# Patient Record
Sex: MALE | Race: WHITE | URBAN - METROPOLITAN AREA
[De-identification: names, ages, dates, MRNs, and addresses within clinical notes are randomized per-mention and may not be internally consistent; named-entity substitution may affect disease eponyms.]

---

## 2017-09-11 DIAGNOSIS — E78.5 HYPERLIPIDEMIA LDL GOAL <130: ICD-10-CM

## 2017-09-11 DIAGNOSIS — I10 ESSENTIAL HYPERTENSION WITH GOAL BLOOD PRESSURE LESS THAN 140/90: ICD-10-CM

## 2017-09-11 NOTE — TELEPHONE ENCOUNTER
enalapril (VASOTEC) 20 MG tablet  Last Written Prescription Date: 09/22/2016  Last Fill Quantity: 180, # refills: 3  Last Office Visit with [facility], [facility] or [facility] prescribing provider: 09/22/2016       Potassium   Date Value Ref Range Status   01/07/2016 4.4 3.4 - 5.3 mmol/L Final     Creatinine   Date Value Ref Range Status   01/07/2016 0.88 0.66 - 1.25 mg/dL Final     BP Readings from Last 3 Encounters:   12/16/16 131/82   12/06/16 132/88   09/22/16 (!) 142/92      atorvastatin (LIPITOR) 20 MG tablet    Last Written Prescription Date: 09/22/2016  Last Fill Quantity: 90, # refills: 3  Last Office Visit with [facility], [facility] or [facility] prescribing provider: 09/22/2016       Lab Results   Component Value Date    CHOL 190 01/07/2016     Lab Results   Component Value Date    HDL 54 01/07/2016     Lab Results   Component Value Date    LDL 93 01/07/2016     Lab Results   Component Value Date    TRIG 213 01/07/2016     Lab Results   Component Value Date    CHOLHDLRATIO 3.9 07/22/2015      hydrochlorothiazide (HYDRODIURIL) 25 MG tablet     Last Written Prescription Date: 09/22/2016  Last Fill Quantity: 90, # refills: 3  Last Office Visit with [facility], [facility] or [facility] prescribing provider: 09/22/2016       Potassium   Date Value Ref Range Status   01/07/2016 4.4 3.4 - 5.3 mmol/L Final     Creatinine   Date Value Ref Range Status   01/07/2016 0.88 0.66 - 1.25 mg/dL Final     BP Readings from Last 3 Encounters:   12/16/16 131/82   12/06/16 132/88   09/22/16 (!) 142/92     Renata Echavarria MA

## 2017-09-12 NOTE — TELEPHONE ENCOUNTER
Routing refill request to provider for review/approval because:  Labs not current:  K, Rubio, FLP      Ana Bennett RN - BC

## 2017-09-13 RX ORDER — ATORVASTATIN CALCIUM 20 MG/1
TABLET, FILM COATED ORAL
Qty: 90 TABLET | Refills: 0 | Status: SHIPPED | OUTPATIENT
Start: 2017-09-13 | End: 2017-09-18

## 2017-09-13 RX ORDER — HYDROCHLOROTHIAZIDE 25 MG/1
TABLET ORAL
Qty: 90 TABLET | Refills: 0 | Status: SHIPPED | OUTPATIENT
Start: 2017-09-13 | End: 2017-09-18

## 2017-09-13 RX ORDER — ENALAPRIL MALEATE 20 MG/1
TABLET ORAL
Qty: 180 TABLET | Refills: 0 | Status: SHIPPED | OUTPATIENT
Start: 2017-09-13 | End: 2017-09-18

## 2017-09-13 NOTE — TELEPHONE ENCOUNTER
Spoke to patient and informed him of below message. Patient has appointment scheduled for 9/18/2017.  Donna MOMIN CMA ([facility])

## 2017-09-14 NOTE — PROGRESS NOTES
SUBJECTIVE:   Brennon Johnson is a 51 year old male who presents to clinic today for the following health issues:      Diabetes Follow-up      Patient is checking blood sugars: 2 times a week-  fasting    Diabetic concerns: None     Symptoms of hypoglycemia (low blood sugar): none     Paresthesias (numbness or burning in feet) or sores: Yes, sores on both feet     Date of last diabetic eye exam: patient states he has never had one        Amount of exercise or physical activity: 4-5 days/week for an average of greater than 60 minutes    Problems taking medications regularly: No    Medication side effects: none  Diet: regular (no restrictions)      Hypertension- patient monitors blood pressure at home and it runs 130/90.  He denies any side effects from medications.    Patient feels his diet could be better controlled.  He knows what he needs to do, but doesn't put it into practice.    Problem list and histories reviewed & adjusted, as indicated.  Additional history: as documented    Patient Active Problem List   Diagnosis     Hyperlipidemia LDL goal <130     Hypertension goal BP (blood pressure) < 140/90     Type 2 diabetes mellitus without complication (H)     Erectile dysfunction, unspecified erectile dysfunction type     Morbid obesity (H)     Past Surgical History:   Procedure Laterality Date     COLONOSCOPY WITH CO2 INSUFFLATION N/A 12/16/2016    Procedure: COLONOSCOPY WITH CO2 INSUFFLATION;  Surgeon: Joan Fernandez MD;  Location:  OR       Social History   Substance Use Topics     Smoking status: Never Smoker     Smokeless tobacco: Never Used     Alcohol use Yes      Comment: 1 Beer daily     Family History   Problem Relation Age of Onset     Breast Cancer Mother      Hypertension Father      CEREBROVASCULAR DISEASE Father      Prostate Cancer Father      HEART DISEASE Father      Obesity Father          Current Outpatient Prescriptions   Medication Sig Dispense Refill     hydrochlorothiazide  (HYDRODIURIL) 25 MG tablet TAKE 1 TABLET(25 MG) BY MOUTH DAILY 90 tablet 1     atorvastatin (LIPITOR) 20 MG tablet Take 1 tablet (20 mg) by mouth daily 90 tablet 1     enalapril (VASOTEC) 20 MG tablet TAKE 1 TABLET(20 MG) BY MOUTH TWICE DAILY 180 tablet 1     amLODIPine (NORVASC) 5 MG tablet TAKE 1 TABLET(5 MG) BY MOUTH DAILY 90 tablet 1     ketoconazole (NIZORAL) 2 % cream Apply topically daily 60 g 1     sildenafil (REVATIO/VIAGRA) 20 MG tablet Take 1-2 tablets (20-40 mg) by mouth daily as needed for erectile dysfunction Take 30 min to 4 hours before intercourse.  Never use with nitroglycerin, terazosin or doxazosin. 18 tablet 3     blood glucose monitoring (NO BRAND SPECIFIED) meter device kit Use to test blood sugar one times daily or as directed. 1 kit 0     blood glucose monitoring (ACCU-CHEK FASTCLIX) lancets Use to check blood sugar twice daily 1 Box 3     blood glucose monitoring (ACCU-CHEK SMARTVIEW) test strip Use to test blood sugar 2 times daily or as directed. 100 each prn     blood glucose calibration (ACCU-CHEK SMARTVIEW CONTROL) solution Use to calibrate blood glucose monitor as needed as directed. 1 Bottle prn     aspirin 81 MG tablet Take 1 tablet by mouth daily.       MULTI VIT/FL OR 1 TABLET DAILY       [DISCONTINUED] hydrochlorothiazide (HYDRODIURIL) 25 MG tablet TAKE 1 TABLET(25 MG) BY MOUTH DAILY 90 tablet 0     [DISCONTINUED] atorvastatin (LIPITOR) 20 MG tablet TAKE 1 TABLET BY MOUTH DAILY 90 tablet 0     [DISCONTINUED] enalapril (VASOTEC) 20 MG tablet TAKE 1 TABLET(20 MG) BY MOUTH TWICE DAILY 180 tablet 0     [DISCONTINUED] amLODIPine (NORVASC) 5 MG tablet TAKE 1 TABLET(5 MG) BY MOUTH DAILY 30 tablet 0     Allergies   Allergen Reactions     Nkda [No Known Drug Allergies]      BP Readings from Last 3 Encounters:   09/18/17 132/84   12/16/16 131/82   12/06/16 132/88    Wt Readings from Last 3 Encounters:   09/18/17 247 lb 8 oz (112.3 kg)   12/12/16 240 lb (108.9 kg)   09/22/16 243 lb (110.2  "kg)                  Labs reviewed in EPIC        Reviewed and updated as needed this visit by clinical staff       Reviewed and updated as needed this visit by Provider         ROS:  Constitutional, HEENT, cardiovascular, pulmonary, gi and gu systems are negative, except as otherwise noted.      OBJECTIVE:   /84  Pulse 89  Temp 97.3  F (36.3  C) (Oral)  Ht 5' 10\" (1.778 m)  Wt 247 lb 8 oz (112.3 kg)  SpO2 99%  BMI 35.51 kg/m2  Body mass index is 35.51 kg/(m^2).  GENERAL: healthy, alert and no distress  RESP: lungs clear to auscultation - no rales, rhonchi or wheezes  CV: regular rate and rhythm, normal S1 S2, no S3 or S4, no murmur, click or rub, no peripheral edema and peripheral pulses strong  MS: no gross musculoskeletal defects noted, no edema  Diabetic foot exam: normal DP and PT pulses, no trophic changes or ulcerative lesions, normal sensory exam and tinea pedis    Diagnostic Test Results:  Results for orders placed or performed in visit on 09/18/17 (from the past 24 hour(s))   HEMOGLOBIN A1C   Result Value Ref Range    Hemoglobin A1C 6.3 (H) 4.3 - 6.0 %       ASSESSMENT/PLAN:     1. Type 2 diabetes mellitus without complication, without long-term current use of insulin (H)    - Albumin Random Urine Quantitative with Creat Ratio    2. Morbid obesity due to excess calories (H)  Patient declines nutrition referral, Health  referral.    3. Essential hypertension with goal blood pressure less than 140/90  Stable.  Continue current treatment plan and medications.    - hydrochlorothiazide (HYDRODIURIL) 25 MG tablet; TAKE 1 TABLET(25 MG) BY MOUTH DAILY  Dispense: 90 tablet; Refill: 1  - enalapril (VASOTEC) 20 MG tablet; TAKE 1 TABLET(20 MG) BY MOUTH TWICE DAILY  Dispense: 180 tablet; Refill: 1  - amLODIPine (NORVASC) 5 MG tablet; TAKE 1 TABLET(5 MG) BY MOUTH DAILY  Dispense: 90 tablet; Refill: 1    4. Hyperlipidemia LDL goal <130  Stable.  Continue current treatment plan and medications.    - Lipid " panel reflex to direct LDL  - atorvastatin (LIPITOR) 20 MG tablet; Take 1 tablet (20 mg) by mouth daily  Dispense: 90 tablet; Refill: 1    5. Screening for diabetic peripheral neuropathy    - FOOT EXAM  NO CHARGE [75711.114]    6. Need for prophylactic vaccination against Streptococcus pneumoniae (pneumococcus)    - Pneumococcal vaccine 23 valent PPSV23  (Pneumovax) [05456]  - ADMIN: Vaccine, Initial (55367)    7. Tinea pedis of both feet    - ketoconazole (NIZORAL) 2 % cream; Apply topically daily  Dispense: 60 g; Refill: 1    FUTURE APPOINTMENTS:       - Patient moving to [place] and will establish care there.    SUSAN [facility]

## 2017-09-18 ENCOUNTER — OFFICE VISIT (OUTPATIENT)
Dept: INTERNAL MEDICINE | Facility: CLINIC | Age: 52
End: 2017-09-18
Payer: COMMERCIAL

## 2017-09-18 VITALS
HEART RATE: 89 BPM | WEIGHT: 247.5 LBS | DIASTOLIC BLOOD PRESSURE: 84 MMHG | SYSTOLIC BLOOD PRESSURE: 132 MMHG | TEMPERATURE: 97.3 F | HEIGHT: 70 IN | BODY MASS INDEX: 35.43 KG/M2 | OXYGEN SATURATION: 99 %

## 2017-09-18 DIAGNOSIS — B35.3 TINEA PEDIS OF BOTH FEET: ICD-10-CM

## 2017-09-18 DIAGNOSIS — E66.01 MORBID OBESITY DUE TO EXCESS CALORIES (H): ICD-10-CM

## 2017-09-18 DIAGNOSIS — Z23 NEED FOR PROPHYLACTIC VACCINATION AGAINST STREPTOCOCCUS PNEUMONIAE (PNEUMOCOCCUS): ICD-10-CM

## 2017-09-18 DIAGNOSIS — E78.5 HYPERLIPIDEMIA LDL GOAL <130: ICD-10-CM

## 2017-09-18 DIAGNOSIS — Z13.89 SCREENING FOR DIABETIC PERIPHERAL NEUROPATHY: ICD-10-CM

## 2017-09-18 DIAGNOSIS — I10 ESSENTIAL HYPERTENSION WITH GOAL BLOOD PRESSURE LESS THAN 140/90: ICD-10-CM

## 2017-09-18 DIAGNOSIS — E11.9 TYPE 2 DIABETES MELLITUS WITHOUT COMPLICATION, WITHOUT LONG-TERM CURRENT USE OF INSULIN (H): Primary | ICD-10-CM

## 2017-09-18 LAB
ANION GAP SERPL CALCULATED.3IONS-SCNC: 10 MMOL/L (ref 3–14)
BUN SERPL-MCNC: 29 MG/DL (ref 7–30)
CALCIUM SERPL-MCNC: 9.1 MG/DL (ref 8.5–10.1)
CHLORIDE SERPL-SCNC: 101 MMOL/L (ref 94–109)
CHOLEST SERPL-MCNC: 187 MG/DL
CO2 SERPL-SCNC: 26 MMOL/L (ref 20–32)
CREAT SERPL-MCNC: 1.02 MG/DL (ref 0.66–1.25)
CREAT UR-MCNC: 157 MG/DL
GFR SERPL CREATININE-BSD FRML MDRD: 77 ML/MIN/1.7M2
GLUCOSE SERPL-MCNC: 151 MG/DL (ref 70–99)
HBA1C MFR BLD: 6.3 % (ref 4.3–6)
HDLC SERPL-MCNC: 57 MG/DL
LDLC SERPL CALC-MCNC: 84 MG/DL
MICROALBUMIN UR-MCNC: 24 MG/L
MICROALBUMIN/CREAT UR: 15.16 MG/G CR (ref 0–17)
NONHDLC SERPL-MCNC: 130 MG/DL
POTASSIUM SERPL-SCNC: 4.1 MMOL/L (ref 3.4–5.3)
SODIUM SERPL-SCNC: 137 MMOL/L (ref 133–144)
TRIGL SERPL-MCNC: 228 MG/DL

## 2017-09-18 PROCEDURE — 36415 COLL VENOUS BLD VENIPUNCTURE: CPT | Performed by: NURSE PRACTITIONER

## 2017-09-18 PROCEDURE — 82043 UR ALBUMIN QUANTITATIVE: CPT | Performed by: NURSE PRACTITIONER

## 2017-09-18 PROCEDURE — 80061 LIPID PANEL: CPT | Performed by: NURSE PRACTITIONER

## 2017-09-18 PROCEDURE — 90471 IMMUNIZATION ADMIN: CPT | Performed by: NURSE PRACTITIONER

## 2017-09-18 PROCEDURE — 83036 HEMOGLOBIN GLYCOSYLATED A1C: CPT | Performed by: NURSE PRACTITIONER

## 2017-09-18 PROCEDURE — 90732 PPSV23 VACC 2 YRS+ SUBQ/IM: CPT | Performed by: NURSE PRACTITIONER

## 2017-09-18 PROCEDURE — 80048 BASIC METABOLIC PNL TOTAL CA: CPT | Performed by: NURSE PRACTITIONER

## 2017-09-18 PROCEDURE — 99214 OFFICE O/P EST MOD 30 MIN: CPT | Mod: 25 | Performed by: NURSE PRACTITIONER

## 2017-09-18 PROCEDURE — 99207 C FOOT EXAM  NO CHARGE: CPT | Performed by: NURSE PRACTITIONER

## 2017-09-18 RX ORDER — AMLODIPINE BESYLATE 5 MG/1
TABLET ORAL
Qty: 90 TABLET | Refills: 1 | Status: SHIPPED | OUTPATIENT
Start: 2017-09-18 | End: 2018-03-15

## 2017-09-18 RX ORDER — ATORVASTATIN CALCIUM 20 MG/1
20 TABLET, FILM COATED ORAL DAILY
Qty: 90 TABLET | Refills: 1 | Status: SHIPPED | OUTPATIENT
Start: 2017-09-18 | End: 2018-09-21

## 2017-09-18 RX ORDER — KETOCONAZOLE 20 MG/G
CREAM TOPICAL DAILY
Qty: 60 G | Refills: 1 | Status: SHIPPED | OUTPATIENT
Start: 2017-09-18 | End: 2018-09-21

## 2017-09-18 RX ORDER — ENALAPRIL MALEATE 20 MG/1
TABLET ORAL
Qty: 180 TABLET | Refills: 1 | Status: SHIPPED | OUTPATIENT
Start: 2017-09-18 | End: 2018-06-10

## 2017-09-18 RX ORDER — HYDROCHLOROTHIAZIDE 25 MG/1
TABLET ORAL
Qty: 90 TABLET | Refills: 1 | Status: SHIPPED | OUTPATIENT
Start: 2017-09-18 | End: 2018-06-11

## 2017-09-18 ASSESSMENT — PAIN SCALES - GENERAL: PAINLEVEL: NO PAIN (0)

## 2017-09-18 NOTE — PROGRESS NOTES
Dear Brennon,    Your recent test results are attached.       No excess protein in the urine.      If you have any questions please feel free to contact [phone] or myself via TrialPayt.    Sincerely,  Christina Berman, CNP

## 2017-09-18 NOTE — MR AVS SNAPSHOT
After Visit Summary   9/18/2017    Brennon Johnson    MRN: [id number]           Patient Information     Date Of Birth          1965        Visit Information        Provider Department      9/18/2017 8:20 AM Christina Berman APRN [facility]        Today's Diagnoses     Type 2 diabetes mellitus without complication, without long-term current use of insulin (H)    -  1    Morbid obesity due to excess calories (H)        Essential hypertension with goal blood pressure less than 140/90        Hyperlipidemia LDL goal <130        Screening for diabetic peripheral neuropathy        Need for prophylactic vaccination against Streptococcus pneumoniae (pneumococcus)        Tinea pedis of both feet          Care Instructions    [facility]    If you have any questions regarding to your visit please contact your care team:     Team Pink:   Clinic Hours Telephone Number   Internal Medicine:  Dr. Dayana Berman, NP       7am-7pm  Monday - Thursday   7am-5pm  Fridays  [phone]  (Appointment scheduling available 24/7)    Questions about your visit?  Team Line  [phone]   Urgent Care - [place] - 11am-9pm Monday-Friday Saturday-Sunday- 9am-5pm   [place] - 5pm-9pm Monday-Friday Saturday-Sunday- 9am-5pm  [phone] - [place]   [phone] - [place]       What options do I have for visits at the clinic other than the traditional office visit?  To expand how we care for you, many of our providers are utilizing electronic visits (e-visits) and telephone visits, when medically appropriate, for interactions with their patients rather than a visit in the clinic.   We also offer nurse visits for many medical concerns. Just like any other service, we will bill your insurance company for this type of visit based on time spent on the phone with your provider. Not all insurance companies cover these  visits. Please check with your medical insurance if this type of visit is covered. You will be responsible for any charges that are not paid by your insurance.      E-visits via Aqua-toolshart:  generally incur a $35.00 fee.  Telephone visits:  Time spent on the phone: *charged based on time that is spent on the phone in increments of 10 minutes. Estimated cost:   5-10 mins $30.00   11-20 mins. $59.00   21-30 mins. $85.00   Use Ridejoy (secure email communication and access to your chart) to send your primary care provider a message or make an appointment. Ask someone on your Team how to sign up for Ridejoy.    For a Price Quote for your services, please call our cooala - your brands Line at [phone].    As always, Thank you for trusting us with your health care needs!    Nirmala Sanders, CMA            Follow-ups after your visit        Who to contact     If you have questions or need follow up information about today's clinic visit or your schedule please contact [facility] directly at [phone].  Normal or non-critical lab and imaging results will be communicated to you by Aqua-toolshart, letter or phone within 4 business days after the clinic has received the results. If you do not hear from us within 7 days, please contact the clinic through Mysafeplacet or phone. If you have a critical or abnormal lab result, we will notify you by phone as soon as possible.  Submit refill requests through Ridejoy or call your pharmacy and they will forward the refill request to us. Please allow 3 business days for your refill to be completed.          Additional Information About Your Visit        Aqua-toolshart Information     Ridejoy gives you secure access to your electronic health record. If you see a primary care provider, you can also send messages to your care team and make appointments. If you have questions, please call your primary care clinic.  If you do not have a primary care provider, please call [phone] and they will  "assist you.        Care EveryWhere ID     This is your Care EveryWhere ID. This could be used by other organizations to access your [facility] medical records  [id number]        Your Vitals Were     Pulse Temperature Height Pulse Oximetry BMI (Body Mass Index)       89 97.3  F (36.3  C) (Oral) 5' 10\" (1.778 m) 99% 35.51 kg/m2        Blood Pressure from Last 3 Encounters:   09/18/17 132/84   12/16/16 131/82   12/06/16 132/88    Weight from Last 3 Encounters:   09/18/17 247 lb 8 oz (112.3 kg)   12/12/16 240 lb (108.9 kg)   09/22/16 243 lb (110.2 kg)              We Performed the Following     ADMIN: Vaccine, Initial (31966)     Albumin Random Urine Quantitative with Creat Ratio     BASIC METABOLIC PANEL     FOOT EXAM  NO CHARGE [16261.114]     HEMOGLOBIN A1C     Lipid panel reflex to direct LDL     Pneumococcal vaccine 23 valent PPSV23  (Pneumovax) [29918]          Today's Medication Changes          These changes are accurate as of: 9/18/17  8:55 AM.  If you have any questions, ask your nurse or doctor.               Start taking these medicines.        Dose/Directions    ketoconazole 2 % cream   Commonly known as:  NIZORAL   Used for:  Tinea pedis of both feet   Started by:  Christina Berman APRN CNP        Apply topically daily   Quantity:  60 g   Refills:  1         These medicines have changed or have updated prescriptions.        Dose/Directions    amLODIPine 5 MG tablet   Commonly known as:  NORVASC   This may have changed:  See the new instructions.   Used for:  Essential hypertension with goal blood pressure less than 140/90   Changed by:  Christina Berman APRN CNP        TAKE 1 TABLET(5 MG) BY MOUTH DAILY   Quantity:  90 tablet   Refills:  1       atorvastatin 20 MG tablet   Commonly known as:  LIPITOR   This may have changed:  See the new instructions.   Used for:  Hyperlipidemia LDL goal <130   Changed by:  Christina Berman APRN CNP        Dose:  20 mg   Take 1 tablet (20 mg) by " mouth daily   Quantity:  90 tablet   Refills:  1       enalapril 20 MG tablet   Commonly known as:  VASOTEC   This may have changed:  See the new instructions.   Used for:  Essential hypertension with goal blood pressure less than 140/90   Changed by:  Christina Berman APRN CNP        TAKE 1 TABLET(20 MG) BY MOUTH TWICE DAILY   Quantity:  180 tablet   Refills:  1       hydrochlorothiazide 25 MG tablet   Commonly known as:  HYDRODIURIL   This may have changed:  See the new instructions.   Used for:  Essential hypertension with goal blood pressure less than 140/90   Changed by:  Christina Berman APRN CNP        TAKE 1 TABLET(25 MG) BY MOUTH DAILY   Quantity:  90 tablet   Refills:  1            Where to get your medicines      These medications were sent to [id number] [place] [facility], [address] AT [facility] [address]  [address] [facility] [facility] [address], [place] [address]     Phone:  [phone]     amLODIPine 5 MG tablet    atorvastatin 20 MG tablet    enalapril 20 MG tablet    hydrochlorothiazide 25 MG tablet    ketoconazole 2 % cream                Primary Care Provider Office Phone # Fax #    Hugo Perez [phone] [address] [facility] [address]        Equal Access to Services     [facility] AH: Hadii candi ku hadasho Soomaali, waaxda luqadaha, qaybta kaalmada adeegyada, waxay idiin hayjayeshn aleksandar laughlin. So [facility] [phone].    ATENCIÓN: Si habla español, tiene a villalba disposición servicios gratuitos de asistencia lingüística. ame al [phone].    We comply with applicable federal civil rights laws and [place] laws. We do not discriminate on the basis of race, color, national origin, age, disability sex, sexual orientation or gender identity.            Thank you!     Thank you for choosing [facility]  for your care. Our goal is always to provide you with excellent care. Hearing back from our patients is one  way we can continue to improve our services. Please take a few minutes to complete the written survey that you may receive in the mail after your visit with us. Thank you!             Your Updated Medication List - Protect others around you: Learn how to safely use, store and throw away your medicines at www.disposemymeds.org.          This list is accurate as of: 9/18/17  8:55 AM.  Always use your most recent med list.                   Brand Name Dispense Instructions for use Diagnosis    amLODIPine 5 MG tablet    NORVASC    90 tablet    TAKE 1 TABLET(5 MG) BY MOUTH DAILY    Essential hypertension with goal blood pressure less than 140/90       aspirin 81 MG tablet      Take 1 tablet by mouth daily.        atorvastatin 20 MG tablet    LIPITOR    90 tablet    Take 1 tablet (20 mg) by mouth daily    Hyperlipidemia LDL goal <130       blood glucose calibration solution     1 Bottle    Use to calibrate blood glucose monitor as needed as directed.    Diabetes mellitus, type 2 (H)       blood glucose monitoring lancets     1 Box    Use to check blood sugar twice daily    Diabetes mellitus, type 2 (H)       blood glucose monitoring meter device kit    no brand specified    1 kit    Use to test blood sugar one times daily or as directed.    Type 2 diabetes mellitus without complication (H)       blood glucose monitoring test strip    ACCU-CHEK SMARTVIEW    100 each    Use to test blood sugar 2 times daily or as directed.    Diabetes mellitus, type 2 (H)       enalapril 20 MG tablet    VASOTEC    180 tablet    TAKE 1 TABLET(20 MG) BY MOUTH TWICE DAILY    Essential hypertension with goal blood pressure less than 140/90       hydrochlorothiazide 25 MG tablet    HYDRODIURIL    90 tablet    TAKE 1 TABLET(25 MG) BY MOUTH DAILY    Essential hypertension with goal blood pressure less than 140/90       ketoconazole 2 % cream    NIZORAL    60 g    Apply topically daily    Tinea pedis of both feet       MULTI VIT/FL PO      1 TABLET  DAILY        sildenafil 20 MG tablet    REVATIO    18 tablet    Take 1-2 tablets (20-40 mg) by mouth daily as needed for erectile dysfunction Take 30 min to 4 hours before intercourse.  Never use with nitroglycerin, terazosin or doxazosin.    Erectile dysfunction, unspecified erectile dysfunction type

## 2017-09-18 NOTE — PATIENT INSTRUCTIONS
[facility]    If you have any questions regarding to your visit please contact your care team:     Team Pink:   Clinic Hours Telephone Number   Internal Medicine:  Dr. Dayana Berman NP       7am-7pm  Monday - Thursday   7am-5pm  Fridays  [phone]  (Appointment scheduling available 24/7)    Questions about your visit?  Team Line  [phone]   Urgent [facility] - [place] [facility] - 11am-9pm Monday-Friday Saturday-Sunday- 9am-5pm   [place] - 5pm-9pm Monday-Friday Saturday-Sunday- 9am-5pm  [phone] - [place]   [phone] - [place]       What options do I have for visits at the clinic other than the traditional office visit?  To expand how we care for you, many of our providers are utilizing electronic visits (e-visits) and telephone visits, when medically appropriate, for interactions with their patients rather than a visit in the clinic.   We also offer nurse visits for many medical concerns. Just like any other service, we will bill your insurance company for this type of visit based on time spent on the phone with your provider. Not all insurance companies cover these visits. Please check with your medical insurance if this type of visit is covered. You will be responsible for any charges that are not paid by your insurance.      E-visits via BigBad:  generally incur a $35.00 fee.  Telephone visits:  Time spent on the phone: *charged based on time that is spent on the phone in increments of 10 minutes. Estimated cost:   5-10 mins $30.00   11-20 mins. $59.00   21-30 mins. $85.00   Use Max-Wellnesst (secure email communication and access to your chart) to send your primary care provider a message or make an appointment. Ask someone on your Team how to sign up for BigBad.    For a Price Quote for your services, please call our Consumer Price Line at [phone].    As always, Thank you for trusting us with your health care  needs!    Nirmala Sanders, CMA

## 2017-09-18 NOTE — PROGRESS NOTES
Dear Brennon,    Your recent test results are attached.      Good cholesterol.  Normal kidney function and electrolytes.      If you have any questions please feel free to contact [phone] or myself via SCHEDitt.    Sincerely,  Christina Berman, CNP

## 2017-09-18 NOTE — NURSING NOTE
"Chief Complaint   Patient presents with     Diabetes     follow-up, no concerns       Initial /90 (BP Location: Left arm, Cuff Size: Adult Large)  Pulse 89  Temp 97.3  F (36.3  C) (Oral)  Ht 5' 10\" (1.778 m)  Wt 247 lb 8 oz (112.3 kg)  SpO2 99%  BMI 35.51 kg/m2 Estimated body mass index is 35.51 kg/(m^2) as calculated from the following:    Height as of this encounter: 5' 10\" (1.778 m).    Weight as of this encounter: 247 lb 8 oz (112.3 kg).  Medication Reconciliation: complete       Nirmala Sanders CMA      "

## 2017-11-01 ENCOUNTER — ALLIED HEALTH/NURSE VISIT (OUTPATIENT)
Dept: NURSING | Facility: CLINIC | Age: 52
End: 2017-11-01
Payer: COMMERCIAL

## 2017-11-01 DIAGNOSIS — Z23 NEED FOR PROPHYLACTIC VACCINATION AND INOCULATION AGAINST INFLUENZA: Primary | ICD-10-CM

## 2017-11-01 PROCEDURE — 99207 ZZC NO CHARGE NURSE ONLY: CPT

## 2017-11-01 PROCEDURE — 90471 IMMUNIZATION ADMIN: CPT

## 2017-11-01 PROCEDURE — 90686 IIV4 VACC NO PRSV 0.5 ML IM: CPT

## 2017-11-01 NOTE — NURSING NOTE
Prior to injection verified patient identity using patient's name and date of birth.  Donna MOMIN CMA ([facility])

## 2017-11-01 NOTE — MR AVS SNAPSHOT
After Visit Summary   11/1/2017    Brennon Johnson    MRN: [id number]           Patient Information     Date Of Birth          1965        Visit Information        Provider Department      11/1/2017 9:45 AM FZ ANCILLARY [facility]        Today's Diagnoses     Need for prophylactic vaccination and inoculation against influenza    -  1       Follow-ups after your visit        Who to contact     If you have questions or need follow up information about today's clinic visit or your schedule please contact [facility] directly at [phone].  Normal or non-critical lab and imaging results will be communicated to you by [email or web address], letter or phone within 4 business days after the clinic has received the results. If you do not hear from us within 7 days, please contact the clinic through HealthSourcet or phone. If you have a critical or abnormal lab result, we will notify you by phone as soon as possible.  Submit refill requests through Lanyrd or call your pharmacy and they will forward the refill request to us. Please allow 3 business days for your refill to be completed.          Additional Information About Your Visit        MyChart Information     Lanyrd gives you secure access to your electronic health record. If you see a primary care provider, you can also send messages to your care team and make appointments. If you have questions, please call your primary care clinic.  If you do not have a primary care provider, please call [phone] and they will assist you.        Care EveryWhere ID     This is your Care EveryWhere ID. This could be used by other organizations to access your [facility] medical records  [id number]         Blood Pressure from Last 3 Encounters:   09/18/17 132/84   12/16/16 131/82   12/06/16 132/88    Weight from Last 3 Encounters:   09/18/17 247 lb 8 oz (112.3 kg)   12/12/16 240 lb (108.9 kg)   09/22/16 243 lb (110.2 kg)              We Performed the  Following     FLU VAC, SPLIT VIRUS IM > 3 YO (QUADRIVALENT) [63122]     Vaccine Administration, Initial [25824]        Primary Care Provider Office Phone # Fax #    Hugo Perez [phone] [address] [facility] [address]        Equal Access to Services     [facility] : Hadii aad ku hadasho Soomaali, waaxda luqadaha, qaybta kaalmada adeegyada, waxay idiin hayaan adeeg kharash la'aan . So [facility] [phone].    ATENCIÓN: Si habla español, tiene a villalba disposición servicios gratuitos de asistencia lingüística. Llame al [phone].    We comply with applicable federal civil rights laws and [place] laws. We do not discriminate on the basis of race, color, national origin, age, disability, sex, sexual orientation, or gender identity.            Thank you!     Thank you for choosing [facility]  for your care. Our goal is always to provide you with excellent care. Hearing back from our patients is one way we can continue to improve our services. Please take a few minutes to complete the written survey that you may receive in the mail after your visit with us. Thank you!             Your Updated Medication List - Protect others around you: Learn how to safely use, store and throw away your medicines at www.disposemymeds.org.          This list is accurate as of: 11/1/17  9:45 AM.  Always use your most recent med list.                   Brand Name Dispense Instructions for use Diagnosis    amLODIPine 5 MG tablet    NORVASC    90 tablet    TAKE 1 TABLET(5 MG) BY MOUTH DAILY    Essential hypertension with goal blood pressure less than 140/90       aspirin 81 MG tablet      Take 1 tablet by mouth daily.        atorvastatin 20 MG tablet    LIPITOR    90 tablet    Take 1 tablet (20 mg) by mouth daily    Hyperlipidemia LDL goal <130       blood glucose calibration solution     1 Bottle    Use to calibrate blood glucose monitor as needed as directed.    Diabetes mellitus,  type 2 (H)       blood glucose monitoring lancets     1 Box    Use to check blood sugar twice daily    Diabetes mellitus, type 2 (H)       blood glucose monitoring meter device kit    no brand specified    1 kit    Use to test blood sugar one times daily or as directed.    Type 2 diabetes mellitus without complication (H)       blood glucose monitoring test strip    ACCU-CHEK SMARTVIEW    100 each    Use to test blood sugar 2 times daily or as directed.    Diabetes mellitus, type 2 (H)       enalapril 20 MG tablet    VASOTEC    180 tablet    TAKE 1 TABLET(20 MG) BY MOUTH TWICE DAILY    Essential hypertension with goal blood pressure less than 140/90       hydrochlorothiazide 25 MG tablet    HYDRODIURIL    90 tablet    TAKE 1 TABLET(25 MG) BY MOUTH DAILY    Essential hypertension with goal blood pressure less than 140/90       ketoconazole 2 % cream    NIZORAL    60 g    Apply topically daily    Tinea pedis of both feet       MULTI VIT/FL PO      1 TABLET DAILY        sildenafil 20 MG tablet    REVATIO    18 tablet    Take 1-2 tablets (20-40 mg) by mouth daily as needed for erectile dysfunction Take 30 min to 4 hours before intercourse.  Never use with nitroglycerin, terazosin or doxazosin.    Erectile dysfunction, unspecified erectile dysfunction type

## 2017-12-17 DIAGNOSIS — I10 ESSENTIAL HYPERTENSION WITH GOAL BLOOD PRESSURE LESS THAN 140/90: ICD-10-CM

## 2017-12-17 DIAGNOSIS — E78.5 HYPERLIPIDEMIA LDL GOAL <130: ICD-10-CM

## 2017-12-20 RX ORDER — HYDROCHLOROTHIAZIDE 25 MG/1
TABLET ORAL
Qty: 90 TABLET | Refills: 0 | Status: SHIPPED | OUTPATIENT
Start: 2017-12-20 | End: 2018-09-06

## 2017-12-20 RX ORDER — ATORVASTATIN CALCIUM 20 MG/1
TABLET, FILM COATED ORAL
Qty: 90 TABLET | Refills: 0 | Status: SHIPPED | OUTPATIENT
Start: 2017-12-20 | End: 2018-09-05

## 2017-12-20 NOTE — TELEPHONE ENCOUNTER
Pharmacy closed. Will call after 9 to confirm duplicate.    hydrochlorothiazide (HYDRODIURIL) 25 MG tablet 90 tablet 1 9/18/2017  No      Sig: TAKE 1 TABLET(25 MG) BY MOUTH DAILY     Class: E-Prescribe     Order: [id number]     E-Prescribing Status: Receipt confirmed by pharmacy (9/18/2017  8:51 AM CDT)     atorvastatin (LIPITOR) 20 MG tablet 90 tablet 1 9/18/2017  No      Sig: Take 1 tablet (20 mg) by mouth daily     Class: E-Prescribe     Route: Oral     Order: [id number]     E-Prescribing Status: Receipt confirmed by pharmacy (9/18/2017  8:51 AM CDT)       Donna MOMIN CMA ([facility])

## 2017-12-29 DIAGNOSIS — N52.9 ERECTILE DYSFUNCTION, UNSPECIFIED ERECTILE DYSFUNCTION TYPE: ICD-10-CM

## 2018-01-02 RX ORDER — SILDENAFIL CITRATE 20 MG/1
TABLET ORAL
Qty: 18 TABLET | Refills: 0 | Status: SHIPPED | OUTPATIENT
Start: 2018-01-02 | End: 2018-01-16

## 2018-01-16 DIAGNOSIS — N52.9 ERECTILE DYSFUNCTION, UNSPECIFIED ERECTILE DYSFUNCTION TYPE: ICD-10-CM

## 2018-01-17 RX ORDER — SILDENAFIL CITRATE 20 MG/1
TABLET ORAL
Qty: 18 TABLET | Refills: 0 | Status: SHIPPED | OUTPATIENT
Start: 2018-01-17 | End: 2018-01-22

## 2018-01-17 NOTE — TELEPHONE ENCOUNTER
"Routing refill request to provider for review/approval because:  Failed [facility] refill protocol, see below:      Requested Prescriptions   Pending Prescriptions Disp Refills     sildenafil (REVATIO) 20 MG tablet 18 tablet 0     Sig: Take 1-2 tablets (20-40 mg) by mouth daily as needed for erectile dysfunction Take 30 min to 4 hours before intercourse.  Never use with nitroglycerin, terazosin or doxazosin.    Erectile Dysfuction Protocol Failed    1/16/2018  9:15 AM       Failed - Recent or future visit with authorizing provider    Patient had office visit in the last year or has a visit in the next 30 days with authorizing provider.  See \"Patient Info\" tab in inbasket, or \"Choose Columns\" in Meds & Orders section of the refill encounter.            Passed - Absence of nitrates on medication list       Passed - Absence of Alpha Blockers on Med list       Passed - Patient is age 18 or older        Ana Bennett RN - BC      "

## 2018-09-06 DIAGNOSIS — I10 ESSENTIAL HYPERTENSION WITH GOAL BLOOD PRESSURE LESS THAN 140/90: ICD-10-CM

## 2018-09-06 RX ORDER — HYDROCHLOROTHIAZIDE 25 MG/1
TABLET ORAL
Qty: 90 TABLET | Refills: 0 | Status: SHIPPED | OUTPATIENT
Start: 2018-09-06 | End: 2018-09-21

## 2018-09-06 RX ORDER — ENALAPRIL MALEATE 20 MG/1
TABLET ORAL
Qty: 180 TABLET | Refills: 0 | Status: SHIPPED | OUTPATIENT
Start: 2018-09-06 | End: 2018-09-21

## 2018-09-06 RX ORDER — AMLODIPINE BESYLATE 5 MG/1
TABLET ORAL
Qty: 90 TABLET | Refills: 0 | Status: SHIPPED | OUTPATIENT
Start: 2018-09-06 | End: 2018-09-21

## 2018-09-17 NOTE — PROGRESS NOTES
SUBJECTIVE:   Brennon Johnson is a 52 year old male who presents to clinic today for the following health issues:    Diabetes Follow-up    Diet controlled.    Patient is checking blood sugars: 1 times a week    Diabetic concerns: None     Symptoms of hypoglycemia (low blood sugar): none     Paresthesias (numbness or burning in feet) or sores: No     Date of last diabetic eye exam: DUE     Diabetes Management Resources    Hyperlipidemia Follow-Up    Rate your low fat/cholesterol diet?: good    Taking statin?  No    Other lipid medications/supplements?:  none    Hypertension Follow-up    Outpatient blood pressures are being checked at home.  Results are 130/90.    Low Salt Diet: no added salt    BP Readings from Last 2 Encounters:   09/18/17 132/84   12/16/16 131/82     Hemoglobin A1C (%)   Date Value   09/18/2017 6.3 (H)   09/22/2016 6.2 (H)     LDL Cholesterol Calculated (mg/dL)   Date Value   09/18/2017 84   01/07/2016 93     Weight:   Losing weight  Wt Readings from Last 4 Encounters:   09/21/18 236 lb 3.2 oz (107.1 kg)   09/18/17 247 lb 8 oz (112.3 kg)   12/12/16 240 lb (108.9 kg)   09/22/16 243 lb (110.2 kg)       Amount of exercise or physical activity: 4-5 days/week for an average of 15-30 minutes    Problems taking medications regularly: No    Medication side effects: none    Diet: regular (no restrictions), low salt, low fat/cholesterol and diabetic    Problem list and histories reviewed & adjusted, as indicated.  Additional history: as documented    Patient Active Problem List   Diagnosis     Hyperlipidemia LDL goal <130     Hypertension goal BP (blood pressure) < 140/90     Type 2 diabetes mellitus without complication (H)     Erectile dysfunction, unspecified erectile dysfunction type     Morbid obesity (H)     Past Surgical History:   Procedure Laterality Date     COLONOSCOPY WITH CO2 INSUFFLATION N/A 12/16/2016    Procedure: COLONOSCOPY WITH CO2 INSUFFLATION;  Surgeon: Joan Fernandez MD;   "Location: [facility] OR       Social History   Substance Use Topics     Smoking status: Never Smoker     Smokeless tobacco: Never Used     Alcohol use Yes      Comment: 1 Beer daily     Family History   Problem Relation Age of Onset     Breast Cancer Mother      Hypertension Father      Cerebrovascular Disease Father      Prostate Cancer Father      HEART DISEASE Father      Obesity Father            Reviewed and updated as needed this visit by clinical staff  Tobacco  Allergies  Meds  Med Hx  Surg Hx  Fam Hx  Soc Hx      Reviewed and updated as needed this visit by Provider       ROS:  Constitutional, HEENT, cardiovascular, pulmonary, gi and gu systems are negative, except as otherwise noted.    OBJECTIVE:     /78 (BP Location: Left arm, Patient Position: Chair, Cuff Size: Adult Regular)  Pulse 80  Temp 97.2  F (36.2  C) (Oral)  Resp 14  Ht 5' 10\" (1.778 m)  Wt 236 lb 3.2 oz (107.1 kg)  SpO2 97%  BMI 33.89 kg/m2  Body mass index is 33.89 kg/(m^2).  GENERAL: healthy, alert and no distress  NECK: no adenopathy and thyroid normal to palpation  RESP: lungs clear to auscultation - no rales, rhonchi or wheezes  CV: regular rate and rhythm, normal S1 S2, no S3 or S4, no murmur, click or rub, no peripheral edema.  ABDOMEN: soft, nontender, no masses and bowel sounds normal  MS: no gross musculoskeletal defects noted, no edema    Diabetic foot exam: normal DP and PT pulses, no trophic changes or ulcerative lesions and normal sensory exam    ASSESSMENT/PLAN:     Diabetes Type II, A1c Controlled, non-insulin dependent   Associated with the following complications    Renal Complications:  None    Ophthalmologic Complications: None    Neurologic Complications: None    Peripheral Vascular Complications:  None    Other: None   Plan:  No changes in the patient's current treatment plan      Hyperlipidemia; controlled   Plan:  Medications:   Statin - Lipitor    Hypertension; controlled   Associated with the following " complications:    None   Plan:  Medications:     ACE/ARB - Enalapril  Diuretic - Hctz  Calcium channel blocker - Amlodipine    (E11.9) Type 2 diabetes mellitus without complication, without long-term current use of insulin (H)  (primary encounter diagnosis)  Comment: A1c 6.3. Eye check and foot exam  Plan: Hemoglobin A1c, Lipid panel reflex to direct         LDL Fasting, Basic metabolic panel, Albumin         Random Urine Quantitative with Creat Ratio,         OPHTHALMOLOGY ADULT REFERRAL    (E78.5) Hyperlipidemia LDL goal <100  Comment: Check LDL and TSH. Refill medication.  Plan: TSH with free T4 reflex, atorvastatin (LIPITOR)        20 MG tablet    (I10) Hypertension goal BP (blood pressure) < 140/90  Comment: At goal. BMP. Refill medication  Plan: hydrochlorothiazide (HYDRODIURIL) 25 MG tablet,        enalapril (VASOTEC) 20 MG tablet, amLODIPine         (NORVASC) 5 MG tablet    (Z68.33) BMI 33.0-33.9,adult  Comment: Losing weight  Plan: Health lifestyle    (Z23) Need for prophylactic vaccination and inoculation against influenza  Plan: FLU VACCINE, (RIV4) RECOMBINANT HA  , IM         (FluBlok, egg free) [19258]- >18 YRS ([facility]         recommended  50-64 YRS), Vaccine         Administration, Initial [12181]    Follow up in 6 months or sooner with concerns    Hugo Perez MD  [facility]

## 2018-09-21 ENCOUNTER — OFFICE VISIT (OUTPATIENT)
Dept: FAMILY MEDICINE | Facility: CLINIC | Age: 53
End: 2018-09-21
Payer: COMMERCIAL

## 2018-09-21 VITALS
HEIGHT: 70 IN | SYSTOLIC BLOOD PRESSURE: 112 MMHG | DIASTOLIC BLOOD PRESSURE: 78 MMHG | OXYGEN SATURATION: 97 % | BODY MASS INDEX: 33.82 KG/M2 | RESPIRATION RATE: 14 BRPM | HEART RATE: 80 BPM | WEIGHT: 236.2 LBS | TEMPERATURE: 97.2 F

## 2018-09-21 DIAGNOSIS — I10 HYPERTENSION GOAL BP (BLOOD PRESSURE) < 140/90: ICD-10-CM

## 2018-09-21 DIAGNOSIS — E78.5 HYPERLIPIDEMIA LDL GOAL <100: ICD-10-CM

## 2018-09-21 DIAGNOSIS — Z23 NEED FOR PROPHYLACTIC VACCINATION AND INOCULATION AGAINST INFLUENZA: ICD-10-CM

## 2018-09-21 DIAGNOSIS — E11.9 TYPE 2 DIABETES MELLITUS WITHOUT COMPLICATION, WITHOUT LONG-TERM CURRENT USE OF INSULIN (H): Primary | ICD-10-CM

## 2018-09-21 LAB
ANION GAP SERPL CALCULATED.3IONS-SCNC: 8 MMOL/L (ref 3–14)
BUN SERPL-MCNC: 26 MG/DL (ref 7–30)
CALCIUM SERPL-MCNC: 9.7 MG/DL (ref 8.5–10.1)
CHLORIDE SERPL-SCNC: 106 MMOL/L (ref 94–109)
CHOLEST SERPL-MCNC: 182 MG/DL
CO2 SERPL-SCNC: 27 MMOL/L (ref 20–32)
CREAT SERPL-MCNC: 1.03 MG/DL (ref 0.66–1.25)
CREAT UR-MCNC: 188 MG/DL
GFR SERPL CREATININE-BSD FRML MDRD: 76 ML/MIN/1.7M2
GLUCOSE SERPL-MCNC: 140 MG/DL (ref 70–99)
HBA1C MFR BLD: 6.3 % (ref 0–5.6)
HDLC SERPL-MCNC: 50 MG/DL
LDLC SERPL CALC-MCNC: 96 MG/DL
MICROALBUMIN UR-MCNC: 12 MG/L
MICROALBUMIN/CREAT UR: 6.49 MG/G CR (ref 0–17)
NONHDLC SERPL-MCNC: 132 MG/DL
POTASSIUM SERPL-SCNC: 4.4 MMOL/L (ref 3.4–5.3)
SODIUM SERPL-SCNC: 141 MMOL/L (ref 133–144)
TRIGL SERPL-MCNC: 182 MG/DL
TSH SERPL DL<=0.005 MIU/L-ACNC: 2.15 MU/L (ref 0.4–4)

## 2018-09-21 PROCEDURE — 80061 LIPID PANEL: CPT | Performed by: FAMILY MEDICINE

## 2018-09-21 PROCEDURE — 90471 IMMUNIZATION ADMIN: CPT | Performed by: FAMILY MEDICINE

## 2018-09-21 PROCEDURE — 84443 ASSAY THYROID STIM HORMONE: CPT | Performed by: FAMILY MEDICINE

## 2018-09-21 PROCEDURE — 82043 UR ALBUMIN QUANTITATIVE: CPT | Performed by: FAMILY MEDICINE

## 2018-09-21 PROCEDURE — 80048 BASIC METABOLIC PNL TOTAL CA: CPT | Performed by: FAMILY MEDICINE

## 2018-09-21 PROCEDURE — 99214 OFFICE O/P EST MOD 30 MIN: CPT | Mod: 25 | Performed by: FAMILY MEDICINE

## 2018-09-21 PROCEDURE — 36415 COLL VENOUS BLD VENIPUNCTURE: CPT | Performed by: FAMILY MEDICINE

## 2018-09-21 PROCEDURE — 90682 RIV4 VACC RECOMBINANT DNA IM: CPT | Performed by: FAMILY MEDICINE

## 2018-09-21 PROCEDURE — 83036 HEMOGLOBIN GLYCOSYLATED A1C: CPT | Performed by: FAMILY MEDICINE

## 2018-09-21 RX ORDER — ATORVASTATIN CALCIUM 20 MG/1
20 TABLET, FILM COATED ORAL DAILY
Qty: 90 TABLET | Refills: 3 | Status: SHIPPED | OUTPATIENT
Start: 2018-09-21 | End: 2019-04-07

## 2018-09-21 RX ORDER — ENALAPRIL MALEATE 20 MG/1
TABLET ORAL
Qty: 180 TABLET | Refills: 3 | Status: SHIPPED | OUTPATIENT
Start: 2018-09-21 | End: 2018-10-04

## 2018-09-21 RX ORDER — AMLODIPINE BESYLATE 5 MG/1
TABLET ORAL
Qty: 90 TABLET | Refills: 3 | Status: SHIPPED | OUTPATIENT
Start: 2018-09-21 | End: 2018-10-04

## 2018-09-21 RX ORDER — HYDROCHLOROTHIAZIDE 25 MG/1
25 TABLET ORAL DAILY
Qty: 90 TABLET | Refills: 3 | Status: SHIPPED | OUTPATIENT
Start: 2018-09-21 | End: 2019-04-07

## 2018-09-21 NOTE — NURSING NOTE
"Chief Complaint   Patient presents with     Labs Only     fasting labs     Recheck Medication     Hypertension     Diabetes     Lipids     Initial /78 (BP Location: Left arm, Patient Position: Chair, Cuff Size: Adult Regular)  Pulse 80  Temp 97.2  F (36.2  C) (Oral)  Resp 14  Ht 5' 10\" (1.778 m)  Wt 236 lb 3.2 oz (107.1 kg)  SpO2 97%  BMI 33.89 kg/m2 Estimated body mass index is 33.89 kg/(m^2) as calculated from the following:    Height as of this encounter: 5' 10\" (1.778 m).    Weight as of this encounter: 236 lb 3.2 oz (107.1 kg).  BP completed using cuff size: mars Edwards  "

## 2018-09-21 NOTE — PROGRESS NOTES

## 2018-09-21 NOTE — PATIENT INSTRUCTIONS
[facility]    If you have any questions regarding to your visit please contact your care team:       Team Purple:   Clinic Hours Telephone Number   Dr. Liliana Lovell   7am-7pm  Monday - Thursday   7am-5pm  Fridays  [phone]  (Appointment scheduling available 24/7)   Urgent Care - [place] and [facility] - 11am-9pm Monday-Friday Saturday-Sunday- 9am-5pm   [place] - 5pm-9pm Monday-Friday Saturday-Sunday- 9am-5pm  [phone] - [place]  [phone] - [place]       What options do I have for a visit other than an office visit? We offer electronic visits (e-visits) and telephone visits, when medically appropriate.  Please check with your medical insurance to see if these types of visits are covered, as you will be responsible for any charges that are not paid by your insurance.      You can use RhinoCyte (secure electronic communication) to access to your chart, send your primary care provider a message, or make an appointment. Ask a team member how to get started.     For a price quote for your services, please call our Consumer Price Line at [phone] or our Imaging Cost estimation line at [phone] (for imaging tests).    René Edwards

## 2019-04-07 ENCOUNTER — MYC REFILL (OUTPATIENT)
Dept: FAMILY MEDICINE | Facility: CLINIC | Age: 54
End: 2019-04-07

## 2019-04-07 DIAGNOSIS — I10 HYPERTENSION GOAL BP (BLOOD PRESSURE) < 140/90: ICD-10-CM

## 2019-04-07 DIAGNOSIS — E78.5 HYPERLIPIDEMIA LDL GOAL <100: ICD-10-CM

## 2019-04-08 RX ORDER — ATORVASTATIN CALCIUM 20 MG/1
20 TABLET, FILM COATED ORAL DAILY
Qty: 90 TABLET | Refills: 1 | Status: SHIPPED | OUTPATIENT
Start: 2019-04-08

## 2019-04-08 RX ORDER — HYDROCHLOROTHIAZIDE 25 MG/1
25 TABLET ORAL DAILY
Qty: 90 TABLET | Refills: 1 | Status: SHIPPED | OUTPATIENT
Start: 2019-04-08

## 2019-04-08 RX ORDER — ENALAPRIL MALEATE 20 MG/1
TABLET ORAL
Qty: 180 TABLET | Refills: 1 | Status: SHIPPED | OUTPATIENT
Start: 2019-04-08

## 2019-04-08 RX ORDER — AMLODIPINE BESYLATE 5 MG/1
TABLET ORAL
Qty: 90 TABLET | Refills: 1 | Status: SHIPPED | OUTPATIENT
Start: 2019-04-08

## 2019-06-06 ENCOUNTER — TELEPHONE (OUTPATIENT)
Dept: FAMILY MEDICINE | Facility: CLINIC | Age: 54
End: 2019-06-06

## 2019-06-07 ENCOUNTER — TELEPHONE (OUTPATIENT)
Dept: FAMILY MEDICINE | Facility: CLINIC | Age: 54
End: 2019-06-07

## 2019-06-07 NOTE — TELEPHONE ENCOUNTER
Panel Management Review      Patient has the following on his problem list:     Diabetes    ASA: Passed    Last A1C  Lab Results   Component Value Date    A1C 6.3 09/21/2018    A1C 6.3 09/18/2017    A1C 6.2 09/22/2016    A1C 6.2 04/04/2016    A1C 6.5 01/07/2016     A1C tested: Passed    Last LDL:    Lab Results   Component Value Date    CHOL 182 09/21/2018     Lab Results   Component Value Date    HDL 50 09/21/2018     Lab Results   Component Value Date    LDL 96 09/21/2018     Lab Results   Component Value Date    TRIG 182 09/21/2018     Lab Results   Component Value Date    CHOLHDLRATIO 3.9 07/22/2015     Lab Results   Component Value Date    NHDL 132 09/21/2018       Is the patient on a Statin? YES             Is the patient on Aspirin? YES    Medications     [facility]MG CoA Reductase Inhibitors     atorvastatin (LIPITOR) 20 MG tablet       Salicylates     aspirin 81 MG tablet             Last three blood pressure readings:  BP Readings from Last 3 Encounters:   09/21/18 112/78   09/18/17 132/84   12/16/16 131/82       Date of last diabetes office visit: 09/21/2018     Tobacco History:     History   Smoking Status     Never Smoker   Smokeless Tobacco     Never Used           Composite cancer screening  Chart review shows that this patient is due/due soon for the following None  Summary:    Patient is due/failing the following:   Preventive care visit, HIV Screening, Diabetic Foot Exam, PHQ2, A1C    Action needed:   Patient needs office visit for PHYSICAL AND DIABETIC CHECK.    Type of outreach:    Phone, left message for patient to call back.     Questions for provider review:    None                                                                                                                                    Stacey Oshea MA       Chart routed to Care Team .

## 2019-06-07 NOTE — LETTER
June 12, 2019          Brennon Johnson,  [address] [place] [address]        Dear Brennon Johnson      Monitoring and managing your preventative and chronic health conditions are very important to us. Our records indicate that you have not scheduled for Diabetic Check and Annual physical exam  which was recommended by Dr. Perez.      If you have received your health care elsewhere, please call the clinic so the information can be documented in your chart.    Please call [phone] or message us through your Audacious account to schedule an appointment or provide information for your chart.     Feel free to contact us if you have any questions or concerns!    I look forward to seeing you and working with you on your health care needs.     Sincerely,       Your [facility] Team/AV

## 2019-06-11 NOTE — TELEPHONE ENCOUNTER
Left patient VM to return call. please schedule physical and diabetes check if call is return.  Elaine Oshea CMA on 6/11/2019 at 8:32 AM

## 2019-10-02 ENCOUNTER — HEALTH MAINTENANCE LETTER (OUTPATIENT)
Age: 54
End: 2019-10-02

## 2021-01-15 ENCOUNTER — HEALTH MAINTENANCE LETTER (OUTPATIENT)
Age: 56
End: 2021-01-15

## 2021-09-04 ENCOUNTER — HEALTH MAINTENANCE LETTER (OUTPATIENT)
Age: 56
End: 2021-09-04

## 2022-02-19 ENCOUNTER — HEALTH MAINTENANCE LETTER (OUTPATIENT)
Age: 57
End: 2022-02-19

## 2022-04-16 ENCOUNTER — HEALTH MAINTENANCE LETTER (OUTPATIENT)
Age: 57
End: 2022-04-16

## 2022-10-22 ENCOUNTER — HEALTH MAINTENANCE LETTER (OUTPATIENT)
Age: 57
End: 2022-10-22

## 2023-04-01 ENCOUNTER — HEALTH MAINTENANCE LETTER (OUTPATIENT)
Age: 58
End: 2023-04-01

## 2023-06-01 ENCOUNTER — HEALTH MAINTENANCE LETTER (OUTPATIENT)
Age: 58
End: 2023-06-01

## 2023-07-20 ENCOUNTER — APPOINTMENT (RX ONLY)
Dept: URBAN - METROPOLITAN AREA CLINIC 75 | Facility: CLINIC | Age: 58
Setting detail: DERMATOLOGY
End: 2023-07-20

## 2023-07-20 DIAGNOSIS — D485 NEOPLASM OF UNCERTAIN BEHAVIOR OF SKIN: ICD-10-CM

## 2023-07-20 DIAGNOSIS — L57.3 POIKILODERMA OF CIVATTE: ICD-10-CM

## 2023-07-20 DIAGNOSIS — L57.8 OTHER SKIN CHANGES DUE TO CHRONIC EXPOSURE TO NONIONIZING RADIATION: ICD-10-CM

## 2023-07-20 DIAGNOSIS — L73.8 OTHER SPECIFIED FOLLICULAR DISORDERS: ICD-10-CM

## 2023-07-20 PROBLEM — D48.5 NEOPLASM OF UNCERTAIN BEHAVIOR OF SKIN: Status: ACTIVE | Noted: 2023-07-20

## 2023-07-20 PROCEDURE — 99203 OFFICE O/P NEW LOW 30 MIN: CPT | Mod: 25

## 2023-07-20 PROCEDURE — ? BIOPSY BY SHAVE METHOD

## 2023-07-20 PROCEDURE — ? PRESCRIPTION

## 2023-07-20 PROCEDURE — 11102 TANGNTL BX SKIN SINGLE LES: CPT

## 2023-07-20 PROCEDURE — ? COUNSELING

## 2023-07-20 RX ORDER — FLUOROURACIL 2 G/40G
CREAM TOPICAL
Qty: 40 | Refills: 0 | Status: ERX

## 2023-07-20 RX ORDER — FLUOROURACIL 2 G/40G
CREAM TOPICAL
Qty: 40 | Refills: 0 | Status: CANCELLED | COMMUNITY
Start: 2023-07-20

## 2023-07-20 RX ADMIN — FLUOROURACIL: 2 CREAM TOPICAL at 00:00

## 2023-07-20 ASSESSMENT — LOCATION DETAILED DESCRIPTION DERM: LOCATION DETAILED: LEFT MEDIAL FOREHEAD

## 2023-07-20 ASSESSMENT — LOCATION SIMPLE DESCRIPTION DERM: LOCATION SIMPLE: LEFT FOREHEAD

## 2023-07-20 ASSESSMENT — LOCATION ZONE DERM: LOCATION ZONE: FACE

## 2023-07-20 NOTE — PROCEDURE: BIOPSY BY SHAVE METHOD
Body Location Override (Optional - Billing Will Still Be Based On Selected Body Map Location If Applicable): left lateral superior eyebrow
Detail Level: Detailed
Depth Of Biopsy: dermis
Was A Bandage Applied: Yes
Size Of Lesion In Cm: 0.7
X Size Of Lesion In Cm: 0.6
Biopsy Type: H and E
Biopsy Method: Dermablade
Anesthesia Type: 1% lidocaine with epinephrine
Anesthesia Volume In Cc: 0.3
Additional Anesthesia Volume In Cc (Will Not Render If 0): 0
Hemostasis: Electrocautery
Wound Care: Petrolatum
Dressing: bandage
Destruction After The Procedure: No
Type Of Destruction Used: Curettage
Curettage Text: The wound bed was treated with curettage after the biopsy was performed.
Cryotherapy Text: The wound bed was treated with cryotherapy after the biopsy was performed.
Electrodesiccation Text: The wound bed was treated with electrodesiccation after the biopsy was performed.
Electrodesiccation And Curettage Text: The wound bed was treated with electrodesiccation and curettage after the biopsy was performed.
Silver Nitrate Text: The wound bed was treated with silver nitrate after the biopsy was performed.
Lab: -[id number]
Path Notes (To The Dermatopathologist): A) left lateral superior eyebrow\\nSize: 0.7 x 0.6\\nRO: SCC VS ISK
Consent: Written consent was obtained and risks were reviewed including but not limited to scarring, infection, bleeding, scabbing, incomplete removal, nerve damage and allergy to anesthesia.
Post-Care Instructions: I reviewed with the patient in detail post-care instructions. Patient is to keep the biopsy site dry overnight, and then apply bacitracin twice daily until healed. Patient may apply hydrogen peroxide soaks to remove any crusting.
Notification Instructions: Patient will be notified of biopsy results. However, patient instructed to call the office if not contacted within 2 weeks.
Billing Type: Third-Party Bill
Information: Selecting Yes will display possible errors in your note based on the variables you have selected. This validation is only offered as a suggestion for you. PLEASE NOTE THAT THE VALIDATION TEXT WILL BE REMOVED WHEN YOU FINALIZE YOUR NOTE. IF YOU WANT TO FAX A PRELIMINARY NOTE YOU WILL NEED TO TOGGLE THIS TO 'NO' IF YOU DO NOT WANT IT IN YOUR FAXED NOTE.

## 2023-08-08 ENCOUNTER — APPOINTMENT (RX ONLY)
Dept: URBAN - METROPOLITAN AREA CLINIC 75 | Facility: CLINIC | Age: 58
Setting detail: DERMATOLOGY
End: 2023-08-08

## 2023-08-08 DIAGNOSIS — L24.4 IRRITANT CONTACT DERMATITIS DUE TO DRUGS IN CONTACT WITH SKIN: ICD-10-CM

## 2023-08-08 DIAGNOSIS — L82.1 OTHER SEBORRHEIC KERATOSIS: ICD-10-CM

## 2023-08-08 PROCEDURE — 99213 OFFICE O/P EST LOW 20 MIN: CPT

## 2023-08-08 PROCEDURE — ? COUNSELING

## 2023-08-08 PROCEDURE — ? PATHOLOGY DISCUSSION

## 2023-08-08 PROCEDURE — ? TREATMENT REGIMEN

## 2023-08-08 ASSESSMENT — LOCATION DETAILED DESCRIPTION DERM
LOCATION DETAILED: SUBMENTAL CHIN
LOCATION DETAILED: RIGHT INFERIOR LATERAL MALAR CHEEK
LOCATION DETAILED: LEFT CHIN
LOCATION DETAILED: RIGHT SUPERIOR LATERAL BUCCAL CHEEK
LOCATION DETAILED: LEFT LATERAL BUCCAL CHEEK
LOCATION DETAILED: LEFT SUPERIOR LATERAL BUCCAL CHEEK
LOCATION DETAILED: RIGHT CENTRAL POSTAURICULAR SKIN
LOCATION DETAILED: LEFT LATERAL EYEBROW
LOCATION DETAILED: LEFT CENTRAL POSTAURICULAR SKIN
LOCATION DETAILED: RIGHT INFERIOR CENTRAL BUCCAL CHEEK

## 2023-08-08 ASSESSMENT — LOCATION SIMPLE DESCRIPTION DERM
LOCATION SIMPLE: CHIN
LOCATION SIMPLE: RIGHT CHEEK
LOCATION SIMPLE: SCALP
LOCATION SIMPLE: LEFT CHEEK
LOCATION SIMPLE: LEFT EYEBROW
LOCATION SIMPLE: SUBMENTAL CHIN

## 2023-08-08 ASSESSMENT — LOCATION ZONE DERM
LOCATION ZONE: FACE
LOCATION ZONE: SCALP

## 2023-08-08 NOTE — PROCEDURE: TREATMENT REGIMEN
Detail Level: Simple
Otc Regimen: Hydrocortisone and Aquaphor ointment daily until healed
Plan: Recheck in 1 month

## 2023-09-12 ENCOUNTER — APPOINTMENT (RX ONLY)
Dept: URBAN - METROPOLITAN AREA CLINIC 75 | Facility: CLINIC | Age: 58
Setting detail: DERMATOLOGY
End: 2023-09-12

## 2023-09-12 DIAGNOSIS — L57.8 OTHER SKIN CHANGES DUE TO CHRONIC EXPOSURE TO NONIONIZING RADIATION: ICD-10-CM

## 2023-09-12 DIAGNOSIS — L24.4 IRRITANT CONTACT DERMATITIS DUE TO DRUGS IN CONTACT WITH SKIN: ICD-10-CM

## 2023-09-12 DIAGNOSIS — Z71.89 OTHER SPECIFIED COUNSELING: ICD-10-CM

## 2023-09-12 PROCEDURE — ? COUNSELING

## 2023-09-12 PROCEDURE — 99213 OFFICE O/P EST LOW 20 MIN: CPT

## 2023-09-12 PROCEDURE — ? PRESCRIPTION

## 2023-09-12 PROCEDURE — ? SUNSCREEN RECOMMENDATIONS

## 2023-09-12 RX ORDER — DESONIDE 0.5 MG/G
CREAM TOPICAL BID
Qty: 60 | Refills: 3 | Status: ERX | COMMUNITY
Start: 2023-09-12

## 2023-09-12 RX ADMIN — DESONIDE: 0.5 CREAM TOPICAL at 00:00

## 2023-09-12 ASSESSMENT — LOCATION SIMPLE DESCRIPTION DERM
LOCATION SIMPLE: LEFT CHEEK
LOCATION SIMPLE: LEFT FOREHEAD
LOCATION SIMPLE: LEFT ANTERIOR NECK
LOCATION SIMPLE: RIGHT ANTERIOR NECK
LOCATION SIMPLE: RIGHT CHEEK
LOCATION SIMPLE: CHIN

## 2023-09-12 ASSESSMENT — LOCATION DETAILED DESCRIPTION DERM
LOCATION DETAILED: LEFT SUPERIOR MEDIAL FOREHEAD
LOCATION DETAILED: LEFT CHIN
LOCATION DETAILED: RIGHT CENTRAL MANDIBULAR CHEEK
LOCATION DETAILED: RIGHT SUPERIOR LATERAL NECK
LOCATION DETAILED: LEFT SUPERIOR LATERAL NECK
LOCATION DETAILED: LEFT CENTRAL MANDIBULAR CHEEK

## 2023-09-12 ASSESSMENT — LOCATION ZONE DERM
LOCATION ZONE: FACE
LOCATION ZONE: NECK

## 2023-09-12 NOTE — PROCEDURE: SUNSCREEN RECOMMENDATIONS
Detail Level: Generalized
General Sunscreen Counseling: I recommended a zinc oxide broad spectrum sunscreen with a SPF of 30 or higher.  I explained that SPF 30 sunscreens block approximately 97 percent of the sun's harmful rays.  Sunscreens should be applied at least 15 minutes prior to expected sun exposure and then every 2 hours after that as long as sun exposure continues. If swimming or exercising sunscreen should be reapplied every 45 minutes to an hour after getting wet or sweating.  One ounce, or the equivalent of a shot glass full of sunscreen, is adequate to protect the skin not covered by a bathing suit. I also recommended a lip balm with a sunscreen as well. Sun protective clothing can be used in lieu of sunscreen but must be worn the entire time you are exposed to the sun's rays.
Products Recommended: Elta MD

## 2023-10-17 ENCOUNTER — APPOINTMENT (RX ONLY)
Dept: URBAN - METROPOLITAN AREA CLINIC 75 | Facility: CLINIC | Age: 58
Setting detail: DERMATOLOGY
End: 2023-10-17

## 2023-10-17 DIAGNOSIS — L57.8 OTHER SKIN CHANGES DUE TO CHRONIC EXPOSURE TO NONIONIZING RADIATION: ICD-10-CM

## 2023-10-17 PROCEDURE — ? COUNSELING

## 2023-10-17 PROCEDURE — ? PRESCRIPTION MEDICATION MANAGEMENT

## 2023-10-17 PROCEDURE — 99213 OFFICE O/P EST LOW 20 MIN: CPT

## 2023-10-17 NOTE — PROCEDURE: PRESCRIPTION MEDICATION MANAGEMENT
Plan: Advised pt to do one more treatment of efudex on areas on sides of face/ neck area in 2-3 weeks
Detail Level: Zone
Render In Strict Bullet Format?: No

## 2024-01-14 ENCOUNTER — HEALTH MAINTENANCE LETTER (OUTPATIENT)
Age: 59
End: 2024-01-14

## 2024-01-16 ENCOUNTER — APPOINTMENT (RX ONLY)
Dept: URBAN - METROPOLITAN AREA CLINIC 75 | Facility: CLINIC | Age: 59
Setting detail: DERMATOLOGY
End: 2024-01-16

## 2024-01-16 DIAGNOSIS — L57.0 ACTINIC KERATOSIS: ICD-10-CM

## 2024-01-16 DIAGNOSIS — Z71.89 OTHER SPECIFIED COUNSELING: ICD-10-CM

## 2024-01-16 PROCEDURE — ? TREATMENT REGIMEN

## 2024-01-16 PROCEDURE — ? COUNSELING

## 2024-01-16 PROCEDURE — 99213 OFFICE O/P EST LOW 20 MIN: CPT

## 2024-01-16 ASSESSMENT — LOCATION DETAILED DESCRIPTION DERM
LOCATION DETAILED: RIGHT SUPERIOR LATERAL BUCCAL CHEEK
LOCATION DETAILED: LEFT INFERIOR MEDIAL BUCCAL CHEEK
LOCATION DETAILED: RIGHT INFERIOR LATERAL MALAR CHEEK
LOCATION DETAILED: LEFT SUPERIOR LATERAL BUCCAL CHEEK

## 2024-01-16 ASSESSMENT — LOCATION SIMPLE DESCRIPTION DERM
LOCATION SIMPLE: LEFT CHEEK
LOCATION SIMPLE: RIGHT CHEEK

## 2024-01-16 ASSESSMENT — LOCATION ZONE DERM: LOCATION ZONE: FACE

## 2024-04-19 ENCOUNTER — APPOINTMENT (RX ONLY)
Dept: URBAN - METROPOLITAN AREA CLINIC 75 | Facility: CLINIC | Age: 59
Setting detail: DERMATOLOGY
End: 2024-04-19

## 2024-04-19 DIAGNOSIS — L57.8 OTHER SKIN CHANGES DUE TO CHRONIC EXPOSURE TO NONIONIZING RADIATION: ICD-10-CM

## 2024-04-19 DIAGNOSIS — L57.0 ACTINIC KERATOSIS: ICD-10-CM

## 2024-04-19 PROCEDURE — 99213 OFFICE O/P EST LOW 20 MIN: CPT | Mod: 25

## 2024-04-19 PROCEDURE — ? COUNSELING

## 2024-04-19 PROCEDURE — 17000 DESTRUCT PREMALG LESION: CPT

## 2024-04-19 PROCEDURE — ? LIQUID NITROGEN

## 2024-04-19 PROCEDURE — ? TREATMENT REGIMEN

## 2024-04-19 ASSESSMENT — LOCATION DETAILED DESCRIPTION DERM
LOCATION DETAILED: RIGHT SUPERIOR LATERAL BUCCAL CHEEK
LOCATION DETAILED: LEFT SUPERIOR LATERAL BUCCAL CHEEK
LOCATION DETAILED: RIGHT INFERIOR LATERAL MALAR CHEEK
LOCATION DETAILED: LEFT LATERAL TEMPLE
LOCATION DETAILED: LEFT INFERIOR MEDIAL BUCCAL CHEEK

## 2024-04-19 ASSESSMENT — LOCATION SIMPLE DESCRIPTION DERM
LOCATION SIMPLE: RIGHT CHEEK
LOCATION SIMPLE: LEFT CHEEK
LOCATION SIMPLE: LEFT TEMPLE

## 2024-04-19 ASSESSMENT — LOCATION ZONE DERM: LOCATION ZONE: FACE

## 2024-10-25 ENCOUNTER — APPOINTMENT (RX ONLY)
Dept: URBAN - METROPOLITAN AREA CLINIC 75 | Facility: CLINIC | Age: 59
Setting detail: DERMATOLOGY
End: 2024-10-25

## 2024-10-25 DIAGNOSIS — R21 RASH AND OTHER NONSPECIFIC SKIN ERUPTION: ICD-10-CM

## 2024-10-25 DIAGNOSIS — L57.0 ACTINIC KERATOSIS: ICD-10-CM

## 2024-10-25 DIAGNOSIS — L57.8 OTHER SKIN CHANGES DUE TO CHRONIC EXPOSURE TO NONIONIZING RADIATION: ICD-10-CM

## 2024-10-25 DIAGNOSIS — Z12.83 ENCOUNTER FOR SCREENING FOR MALIGNANT NEOPLASM OF SKIN: ICD-10-CM

## 2024-10-25 PROCEDURE — 17000 DESTRUCT PREMALG LESION: CPT

## 2024-10-25 PROCEDURE — 17003 DESTRUCT PREMALG LES 2-14: CPT

## 2024-10-25 PROCEDURE — ? COUNSELING

## 2024-10-25 PROCEDURE — ? LIQUID NITROGEN

## 2024-10-25 PROCEDURE — ? PRESCRIPTION

## 2024-10-25 PROCEDURE — 99213 OFFICE O/P EST LOW 20 MIN: CPT | Mod: 25

## 2024-10-25 RX ORDER — FLUOCINONIDE 0.5 MG/G
CREAM TOPICAL
Qty: 120 | Refills: 0 | Status: ERX | COMMUNITY
Start: 2024-10-25

## 2024-10-25 RX ADMIN — FLUOCINONIDE: 0.5 CREAM TOPICAL at 00:00

## 2024-10-25 ASSESSMENT — LOCATION ZONE DERM
LOCATION ZONE: SCALP
LOCATION ZONE: FACE
LOCATION ZONE: NECK

## 2024-10-25 ASSESSMENT — LOCATION DETAILED DESCRIPTION DERM
LOCATION DETAILED: LEFT INFERIOR CENTRAL MALAR CHEEK
LOCATION DETAILED: RIGHT SUPERIOR LATERAL NECK
LOCATION DETAILED: LEFT INFERIOR POSTAURICULAR SKIN
LOCATION DETAILED: RIGHT CENTRAL POSTAURICULAR SKIN
LOCATION DETAILED: LEFT SUPERIOR POSTAURICULAR SKIN
LOCATION DETAILED: RIGHT INFERIOR CENTRAL MALAR CHEEK
LOCATION DETAILED: RIGHT SUPERIOR POSTAURICULAR SKIN
LOCATION DETAILED: LEFT LATERAL MALAR CHEEK

## 2024-10-25 ASSESSMENT — LOCATION SIMPLE DESCRIPTION DERM
LOCATION SIMPLE: RIGHT CHEEK
LOCATION SIMPLE: SCALP
LOCATION SIMPLE: NECK
LOCATION SIMPLE: LEFT CHEEK

## 2024-10-25 NOTE — PROCEDURE: LIQUID NITROGEN
Consent: The patient's consent was obtained including but not limited to risks of crusting, scabbing, blistering, scarring, darker or lighter pigmentary change, recurrence, incomplete removal and infection.
Post-Care Instructions: I reviewed with the patient in detail post-care instructions. Patient is to wear sunprotection, and avoid picking at any of the treated lesions. Pt may apply Vaseline to crusted or scabbing areas.
Render Post-Care Instructions In Note?: no
Number Of Freeze-Thaw Cycles: 2 freeze-thaw cycles
Duration Of Freeze Thaw-Cycle (Seconds): 0
Show Applicator Variable?: Yes
Detail Level: Detailed